# Patient Record
(demographics unavailable — no encounter records)

---

## 2024-10-27 NOTE — ASSESSMENT
[FreeTextEntry1] : Routine medical examination VSS- exam normal  c/w current medications Advised healthy diet and exercise will follow up labs  for flu shot today patient verbalizes understanding and patient is stable upon discharge

## 2024-10-27 NOTE — HISTORY OF PRESENT ILLNESS
[FreeTextEntry1] : CPE [de-identified] : 29 yo f, pmhx of ADHD, here for CPE  overall is feeling well denies any other associated symptoms  denies any other complaints or concerns at this time

## 2024-10-27 NOTE — HISTORY OF PRESENT ILLNESS
[FreeTextEntry1] : CPE [de-identified] : 27 yo f, pmhx of ADHD, here for CPE  overall is feeling well denies any other associated symptoms  denies any other complaints or concerns at this time

## 2024-10-27 NOTE — HEALTH RISK ASSESSMENT
[Excellent] : ~his/her~  mood as  excellent [Yes] : Yes [Monthly or less (1 pt)] : Monthly or less (1 point) [1 or 2 (0 pts)] : 1 or 2 (0 points) [Never (0 pts)] : Never (0 points) [No] : In the past 12 months have you used drugs other than those required for medical reasons? No [0] : 2) Feeling down, depressed, or hopeless: Not at all (0) [PHQ-2 Negative - No further assessment needed] : PHQ-2 Negative - No further assessment needed [Patient reported PAP Smear was normal] : Patient reported PAP Smear was normal [HIV test declined] : HIV test declined [Hepatitis C test declined] : Hepatitis C test declined [None] : None [Employed] : employed [Sexually Active] : sexually active [Fully functional (bathing, dressing, toileting, transferring, walking, feeding)] : Fully functional (bathing, dressing, toileting, transferring, walking, feeding) [Fully functional (using the telephone, shopping, preparing meals, housekeeping, doing laundry, using] : Fully functional and needs no help or supervision to perform IADLs (using the telephone, shopping, preparing meals, housekeeping, doing laundry, using transportation, managing medications and managing finances) [Never] : Never [With Family] : lives with family [] :  [FreeTextEntry1] : none  [de-identified] : none [de-identified] : none [Audit-CScore] : 1 [de-identified] : treadmill daily   [de-identified] : balanced; suppplements- none [[id number]] : 0 [Change in mental status noted] : No change in mental status noted [Language] : denies difficulty with language [High Risk Behavior] : no high risk behavior [Reports changes in hearing] : Reports no changes in hearing [Reports changes in vision] : Reports no changes in vision [Reports changes in dental health] : Reports no changes in dental health [PapSmearDate] : 2020 [PapSmearComments] : gyn Tonja Bell  [de-identified] :   [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

## 2024-10-27 NOTE — HEALTH RISK ASSESSMENT
[Excellent] : ~his/her~  mood as  excellent [Yes] : Yes [Monthly or less (1 pt)] : Monthly or less (1 point) [1 or 2 (0 pts)] : 1 or 2 (0 points) [Never (0 pts)] : Never (0 points) [No] : In the past 12 months have you used drugs other than those required for medical reasons? No [0] : 2) Feeling down, depressed, or hopeless: Not at all (0) [PHQ-2 Negative - No further assessment needed] : PHQ-2 Negative - No further assessment needed [Patient reported PAP Smear was normal] : Patient reported PAP Smear was normal [HIV test declined] : HIV test declined [Hepatitis C test declined] : Hepatitis C test declined [None] : None [Employed] : employed [Sexually Active] : sexually active [Fully functional (bathing, dressing, toileting, transferring, walking, feeding)] : Fully functional (bathing, dressing, toileting, transferring, walking, feeding) [Fully functional (using the telephone, shopping, preparing meals, housekeeping, doing laundry, using] : Fully functional and needs no help or supervision to perform IADLs (using the telephone, shopping, preparing meals, housekeeping, doing laundry, using transportation, managing medications and managing finances) [Never] : Never [With Family] : lives with family [] :  [FreeTextEntry1] : none  [de-identified] : none [de-identified] : none [Audit-CScore] : 1 [de-identified] : treadmill daily   [de-identified] : balanced; suppplements- none [CPA2Icuma] : 0 [Change in mental status noted] : No change in mental status noted [Language] : denies difficulty with language [High Risk Behavior] : no high risk behavior [Reports changes in hearing] : Reports no changes in hearing [Reports changes in vision] : Reports no changes in vision [Reports changes in dental health] : Reports no changes in dental health [PapSmearDate] : 2020 [PapSmearComments] : gyn Tonja Bell  [de-identified] :   [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

## 2025-07-07 NOTE — ASSESSMENT
[FreeTextEntry1] :  medication as prescribed, medication education done follow up in 3 months for controlled substance and medication management  follow up in office if sx persists or worsens patient verbalizes understanding and is stable upon d/c  n/a

## 2025-07-07 NOTE — HISTORY OF PRESENT ILLNESS
[Home] : at home, [unfilled] , at the time of the visit. [Medical Office: ([facility])___] : at the medical office located in  [Telehealth (audio & video)] : This visit was provided via telehealth using real-time 2-way audio visual technology. [Verbal consent obtained from patient] : the patient, [unfilled] [FreeTextEntry8] : 27 yo f, pmhx of ADHD, here for medication follow up overall is feeling well on methylphenidate daily  usually takes 27 daily  on lighter days of work takes 18 mg  mood has been good sleep has been good denies any changes in appetite   denies any other associated symptoms denies any other complaints or concerns at this time

## 2025-07-07 NOTE — ASSESSMENT
[FreeTextEntry1] :  medication as prescribed, medication education done follow up in 3 months for controlled substance and medication management  follow up in office if sx persists or worsens patient verbalizes understanding and is stable upon d/c

## 2025-07-07 NOTE — HISTORY OF PRESENT ILLNESS
[Home] : at home, [unfilled] , at the time of the visit. [Medical Office: ([facility])___] : at the medical office located in  [Telehealth (audio & video)] : This visit was provided via telehealth using real-time 2-way audio visual technology. [Verbal consent obtained from patient] : the patient, [unfilled] [FreeTextEntry8] : 27 yo f, pmhx of ADHD, here for medication follow up overall is feeling well on methylphenidate daily  usually takes 27 daily  on lighter days of work takes 18 mg  mood has been good sleep has been good denies any changes in appetite   denies any other associated symptoms denies any other complaints or concerns at this time